# Patient Record
Sex: MALE | URBAN - METROPOLITAN AREA
[De-identification: names, ages, dates, MRNs, and addresses within clinical notes are randomized per-mention and may not be internally consistent; named-entity substitution may affect disease eponyms.]

---

## 2017-01-13 ENCOUNTER — APPOINTMENT (RX ONLY)
Dept: URBAN - METROPOLITAN AREA CLINIC 100 | Facility: CLINIC | Age: 72
Setting detail: DERMATOLOGY
End: 2017-01-13

## 2017-01-13 DIAGNOSIS — L70.0 ACNE VULGARIS: ICD-10-CM

## 2017-01-13 PROCEDURE — ? ACNE SURGERY (MULTIPLE LESIONS)

## 2017-01-13 PROCEDURE — 10040 EXTRACTION: CPT

## 2017-01-13 ASSESSMENT — LOCATION DETAILED DESCRIPTION DERM
LOCATION DETAILED: LEFT INFERIOR CENTRAL MALAR CHEEK
LOCATION DETAILED: NASAL TIP
LOCATION DETAILED: NASAL DORSUM

## 2017-01-13 ASSESSMENT — LOCATION ZONE DERM
LOCATION ZONE: NOSE
LOCATION ZONE: FACE

## 2017-01-13 ASSESSMENT — LOCATION SIMPLE DESCRIPTION DERM
LOCATION SIMPLE: LEFT CHEEK
LOCATION SIMPLE: NOSE

## 2017-01-13 NOTE — PROCEDURE: ACNE SURGERY (MULTIPLE LESIONS)
Prep Text (Optional): Prior to removal the treatment areas were prepped in the usual fashion.
Extraction Method: 18 gauge needle and comedo extractor
Render Post-Care Instructions In Note?: no
Detail Level: Detailed
Total Number Of Lesions Treated: 10
Consent was obtained and risks were reviewed including but not limited to scarring, infection, bleeding, scabbing, incomplete removal, and allergy to anesthesia.
Render Number Of Lesions Treated: yes
Acne Type: Comedonal Lesions
Post-Care Instructions: I reviewed with the patient in detail post-care instructions. Patient is to keep the treatment areaas dry overnight, and then apply bacitracin twice daily until healed. Patient may apply hydrogen peroxide soaks to remove any crusting.

## 2017-03-17 ENCOUNTER — APPOINTMENT (RX ONLY)
Dept: URBAN - METROPOLITAN AREA CLINIC 100 | Facility: CLINIC | Age: 72
Setting detail: DERMATOLOGY
End: 2017-03-17

## 2017-03-17 DIAGNOSIS — L70.0 ACNE VULGARIS: ICD-10-CM

## 2017-03-17 PROBLEM — H91.90 UNSPECIFIED HEARING LOSS, UNSPECIFIED EAR: Status: ACTIVE | Noted: 2017-03-17

## 2017-03-17 PROCEDURE — 10040 EXTRACTION: CPT

## 2017-03-17 PROCEDURE — ? ACNE SURGERY (MULTIPLE LESIONS)

## 2017-03-17 ASSESSMENT — LOCATION DETAILED DESCRIPTION DERM
LOCATION DETAILED: NASAL SUPRATIP
LOCATION DETAILED: LEFT CHIN

## 2017-03-17 ASSESSMENT — LOCATION ZONE DERM
LOCATION ZONE: FACE
LOCATION ZONE: NOSE

## 2017-03-17 ASSESSMENT — LOCATION SIMPLE DESCRIPTION DERM
LOCATION SIMPLE: NOSE
LOCATION SIMPLE: CHIN

## 2017-03-17 NOTE — PROCEDURE: ACNE SURGERY (MULTIPLE LESIONS)
Render Number Of Lesions Treated: yes
Render Post-Care Instructions In Note?: no
Extraction Method: 18 gauge needle and comedo extractor
Total Number Of Lesions Treated: 12
Prep Text (Optional): Prior to removal the treatment areas were prepped in the usual fashion.
Detail Level: Detailed
Acne Type: Comedonal Lesions
Consent was obtained and risks were reviewed including but not limited to scarring, infection, bleeding, scabbing, incomplete removal, and allergy to anesthesia.
Post-Care Instructions: I reviewed with the patient in detail post-care instructions. Patient is to keep the treatment areaas dry overnight, and then apply bacitracin twice daily until healed. Patient may apply hydrogen peroxide soaks to remove any crusting.

## 2017-07-21 ENCOUNTER — APPOINTMENT (RX ONLY)
Dept: URBAN - METROPOLITAN AREA CLINIC 100 | Facility: CLINIC | Age: 72
Setting detail: DERMATOLOGY
End: 2017-07-21

## 2017-07-21 DIAGNOSIS — M71 OTHER BURSOPATHIES: ICD-10-CM

## 2017-07-21 DIAGNOSIS — L81.4 OTHER MELANIN HYPERPIGMENTATION: ICD-10-CM

## 2017-07-21 DIAGNOSIS — Z12.83 ENCOUNTER FOR SCREENING FOR MALIGNANT NEOPLASM OF SKIN: ICD-10-CM

## 2017-07-21 DIAGNOSIS — L82.0 INFLAMED SEBORRHEIC KERATOSIS: ICD-10-CM

## 2017-07-21 DIAGNOSIS — L70.8 OTHER ACNE: ICD-10-CM

## 2017-07-21 DIAGNOSIS — L72.8 OTHER FOLLICULAR CYSTS OF THE SKIN AND SUBCUTANEOUS TISSUE: ICD-10-CM

## 2017-07-21 PROBLEM — Z85.828 PERSONAL HISTORY OF OTHER MALIGNANT NEOPLASM OF SKIN: Status: ACTIVE | Noted: 2017-07-21

## 2017-07-21 PROBLEM — L85.3 XEROSIS CUTIS: Status: ACTIVE | Noted: 2017-07-21

## 2017-07-21 PROBLEM — M12.9 ARTHROPATHY, UNSPECIFIED: Status: ACTIVE | Noted: 2017-07-21

## 2017-07-21 PROBLEM — M71.371 OTHER BURSAL CYST, RIGHT ANKLE AND FOOT: Status: ACTIVE | Noted: 2017-07-21

## 2017-07-21 PROCEDURE — ? COUNSELING

## 2017-07-21 PROCEDURE — 11900 INJECT SKIN LESIONS </W 7: CPT | Mod: 59

## 2017-07-21 PROCEDURE — ? INTRALESIONAL KENALOG

## 2017-07-21 PROCEDURE — ? OTHER

## 2017-07-21 PROCEDURE — 99214 OFFICE O/P EST MOD 30 MIN: CPT | Mod: 25

## 2017-07-21 PROCEDURE — 17110 DESTRUCTION B9 LES UP TO 14: CPT

## 2017-07-21 PROCEDURE — ? LIQUID NITROGEN

## 2017-07-21 ASSESSMENT — LOCATION SIMPLE DESCRIPTION DERM
LOCATION SIMPLE: RIGHT CHEEK
LOCATION SIMPLE: RIGHT CALF
LOCATION SIMPLE: LEFT UPPER BACK
LOCATION SIMPLE: LEFT CHEEK
LOCATION SIMPLE: RIGHT 3RD TOE
LOCATION SIMPLE: LEFT CALF
LOCATION SIMPLE: RIGHT UPPER BACK

## 2017-07-21 ASSESSMENT — LOCATION DETAILED DESCRIPTION DERM
LOCATION DETAILED: RIGHT INFERIOR UPPER BACK
LOCATION DETAILED: RIGHT DISTAL CALF
LOCATION DETAILED: RIGHT DORSAL 3RD TOE
LOCATION DETAILED: RIGHT INFERIOR CENTRAL BUCCAL CHEEK
LOCATION DETAILED: LEFT INFERIOR CENTRAL MALAR CHEEK
LOCATION DETAILED: RIGHT CENTRAL MALAR CHEEK
LOCATION DETAILED: RIGHT SUPERIOR UPPER BACK
LOCATION DETAILED: LEFT MID-UPPER BACK
LOCATION DETAILED: LEFT DISTAL CALF

## 2017-07-21 ASSESSMENT — LOCATION ZONE DERM
LOCATION ZONE: FACE
LOCATION ZONE: TOE
LOCATION ZONE: TRUNK
LOCATION ZONE: LEG

## 2017-07-21 NOTE — PROCEDURE: OTHER
Note Text (......Xxx Chief Complaint.): This diagnosis correlates with the
Detail Level: Zone
Other (Free Text): - continue acne surgery

## 2017-07-21 NOTE — PROCEDURE: LIQUID NITROGEN
Consent: The patient's consent was obtained including but not limited to risks of crusting, scabbing, blistering, scarring, darker or lighter pigmentary change, recurrence, incomplete removal and infection.
Include Z78.9 (Other Specified Conditions Influencing Health Status) As An Associated Diagnosis?: Yes
Render Post-Care Instructions In Note?: no
Medical Necessity Clause: This procedure was medically necessary because the lesions that were treated were:
Medical Necessity Information: It is in your best interest to select a reason for this procedure from the list below. All of these items fulfill various CMS LCD requirements except the new and changing color options.
Post-Care Instructions: I reviewed with the patient in detail post-care instructions. Patient is to wear sunprotection, and avoid picking at any of the treated lesions. Pt may apply Vaseline to crusted or scabbing areas.
Number Of Freeze-Thaw Cycles: 2 freeze-thaw cycles
Detail Level: Detailed

## 2017-07-21 NOTE — PROCEDURE: INTRALESIONAL KENALOG
Medical Necessity Clause: This procedure was medically necessary because the lesions that were treated were:
Kenalog Preparation: Kenalog
Detail Level: Detailed
X Size Of Lesion In Cm (Optional): 0
Include Z78.9 (Other Specified Conditions Influencing Health Status) As An Associated Diagnosis?: No
Lot # (Optional): [id number]
Consent: The risks of atrophy were reviewed with the patient.
Total Volume Injected (Ccs- Only Use Numbers And Decimals): 0.2
Concentration Of Solution Injected (Mg/Ml): 10.0
Administered By (Optional): Ernst
Expiration Date (Optional): 05/2018

## 2017-11-16 ENCOUNTER — APPOINTMENT (RX ONLY)
Dept: URBAN - METROPOLITAN AREA CLINIC 100 | Facility: CLINIC | Age: 72
Setting detail: DERMATOLOGY
End: 2017-11-16

## 2017-11-16 DIAGNOSIS — L70.0 ACNE VULGARIS: ICD-10-CM

## 2017-11-16 PROCEDURE — ? ACNE SURGERY (MULTIPLE LESIONS)

## 2017-11-16 PROCEDURE — 10040 EXTRACTION: CPT

## 2017-11-16 PROCEDURE — ? COUNSELING

## 2017-11-16 ASSESSMENT — LOCATION DETAILED DESCRIPTION DERM
LOCATION DETAILED: LEFT INFERIOR CENTRAL MALAR CHEEK
LOCATION DETAILED: LEFT LOWER CUTANEOUS LIP
LOCATION DETAILED: NASAL DORSUM
LOCATION DETAILED: RIGHT MEDIAL MALAR CHEEK
LOCATION DETAILED: INFERIOR MID FOREHEAD

## 2017-11-16 ASSESSMENT — LOCATION SIMPLE DESCRIPTION DERM
LOCATION SIMPLE: LEFT LIP
LOCATION SIMPLE: LEFT CHEEK
LOCATION SIMPLE: RIGHT CHEEK
LOCATION SIMPLE: INFERIOR FOREHEAD
LOCATION SIMPLE: NOSE

## 2017-11-16 ASSESSMENT — LOCATION ZONE DERM
LOCATION ZONE: NOSE
LOCATION ZONE: FACE
LOCATION ZONE: LIP

## 2017-11-16 NOTE — PROCEDURE: ACNE SURGERY (MULTIPLE LESIONS)
Post-Care Instructions: I reviewed with the patient in detail post-care instructions. Patient is to keep the treatment areas dry overnight, and then apply bacitracin twice daily until healed. Patient may apply hydrogen peroxide soaks to remove any crusting.
Render Post-Care Instructions In Note?: no
Render Number Of Lesions Treated: yes
Detail Level: Simple
Consent was obtained and risks were reviewed including but not limited to scarring, infection, bleeding, scabbing, incomplete removal, and allergy to anesthesia.
Prep Text (Optional): Prior to removal the treatment areas were prepped in the usual fashion.
Extraction Method: 18 gauge needle and comedo extractor
Acne Type: Comedonal Lesions
Total Number Of Lesions Treated: 20

## 2017-12-04 ENCOUNTER — APPOINTMENT (RX ONLY)
Dept: URBAN - METROPOLITAN AREA CLINIC 100 | Facility: CLINIC | Age: 72
Setting detail: DERMATOLOGY
End: 2017-12-04

## 2017-12-04 DIAGNOSIS — L72.8 OTHER FOLLICULAR CYSTS OF THE SKIN AND SUBCUTANEOUS TISSUE: ICD-10-CM

## 2017-12-04 DIAGNOSIS — L57.8 OTHER SKIN CHANGES DUE TO CHRONIC EXPOSURE TO NONIONIZING RADIATION: ICD-10-CM

## 2017-12-04 DIAGNOSIS — L29.8 OTHER PRURITUS: ICD-10-CM

## 2017-12-04 DIAGNOSIS — L82.0 INFLAMED SEBORRHEIC KERATOSIS: ICD-10-CM

## 2017-12-04 DIAGNOSIS — L29.89 OTHER PRURITUS: ICD-10-CM

## 2017-12-04 DIAGNOSIS — M71 OTHER BURSOPATHIES: ICD-10-CM

## 2017-12-04 PROBLEM — M71.372 OTHER BURSAL CYST, LEFT ANKLE AND FOOT: Status: ACTIVE | Noted: 2017-12-04

## 2017-12-04 PROCEDURE — ? PRESCRIPTION

## 2017-12-04 PROCEDURE — ? OTHER (COSMETIC)

## 2017-12-04 PROCEDURE — 11900 INJECT SKIN LESIONS </W 7: CPT | Mod: 59

## 2017-12-04 PROCEDURE — ? COUNSELING

## 2017-12-04 PROCEDURE — 17111 DESTRUCTION B9 LESIONS 15/>: CPT

## 2017-12-04 PROCEDURE — 99213 OFFICE O/P EST LOW 20 MIN: CPT | Mod: 25

## 2017-12-04 PROCEDURE — ? INTRALESIONAL KENALOG

## 2017-12-04 PROCEDURE — ? LIQUID NITROGEN

## 2017-12-04 RX ORDER — GABAPENTIN 300 MG/1
CAPSULE ORAL
Qty: 15 | Refills: 2 | Status: ERX | COMMUNITY
Start: 2017-12-04

## 2017-12-04 RX ADMIN — GABAPENTIN: 300 CAPSULE ORAL at 00:00

## 2017-12-04 ASSESSMENT — LOCATION SIMPLE DESCRIPTION DERM
LOCATION SIMPLE: RIGHT CALF
LOCATION SIMPLE: LEFT PRETIBIAL REGION
LOCATION SIMPLE: RIGHT PRETIBIAL REGION
LOCATION SIMPLE: RIGHT CHEEK
LOCATION SIMPLE: LEFT GREAT TOE
LOCATION SIMPLE: RIGHT THIGH
LOCATION SIMPLE: LEFT UPPER ARM
LOCATION SIMPLE: RIGHT POSTERIOR THIGH
LOCATION SIMPLE: LEFT THIGH
LOCATION SIMPLE: LEFT CHEEK
LOCATION SIMPLE: CHIN
LOCATION SIMPLE: LEFT CALF

## 2017-12-04 ASSESSMENT — LOCATION DETAILED DESCRIPTION DERM
LOCATION DETAILED: RIGHT DISTAL PRETIBIAL REGION
LOCATION DETAILED: RIGHT PROXIMAL PRETIBIAL REGION
LOCATION DETAILED: LEFT PROXIMAL CALF
LOCATION DETAILED: RIGHT ANTERIOR DISTAL THIGH
LOCATION DETAILED: LEFT ANTERIOR DISTAL UPPER ARM
LOCATION DETAILED: LEFT PROXIMAL PRETIBIAL REGION
LOCATION DETAILED: RIGHT PROXIMAL CALF
LOCATION DETAILED: RIGHT INFERIOR CENTRAL MALAR CHEEK
LOCATION DETAILED: LEFT DISTAL PRETIBIAL REGION
LOCATION DETAILED: RIGHT DISTAL CALF
LOCATION DETAILED: RIGHT DISTAL POSTERIOR THIGH
LOCATION DETAILED: LEFT INFERIOR CENTRAL MALAR CHEEK
LOCATION DETAILED: LEFT DORSAL GREAT TOE
LOCATION DETAILED: LEFT CHIN
LOCATION DETAILED: RIGHT CHIN
LOCATION DETAILED: RIGHT MEDIAL PROXIMAL PRETIBIAL REGION
LOCATION DETAILED: LEFT ANTERIOR PROXIMAL THIGH

## 2017-12-04 ASSESSMENT — LOCATION ZONE DERM
LOCATION ZONE: ARM
LOCATION ZONE: TOE
LOCATION ZONE: LEG
LOCATION ZONE: FACE

## 2017-12-04 NOTE — HPI: PIMPLES (ACNE)
How Severe Is Your Acne?: mild
Is This A New Presentation, Or A Follow-Up?: Acne
Additional Comments (Use Complete Sentences): Patient reports lesion on the right chin was treated with ILK several months ago and states it has not resolved.

## 2017-12-04 NOTE — HPI: ITCHING
How Did Your Itching Occur?: gradual in onset  (over a period of years)
How Severe Is Your Itching?: mild
Additional History: Patient states the rash is only present at [facility] and is made better by Benadryl

## 2017-12-04 NOTE — PROCEDURE: LIQUID NITROGEN
Medical Necessity Information: It is in your best interest to select a reason for this procedure from the list below. All of these items fulfill various CMS LCD requirements except the new and changing color options.
Include Z78.9 (Other Specified Conditions Influencing Health Status) As An Associated Diagnosis?: Yes
Render Post-Care Instructions In Note?: no
Medical Necessity Clause: This procedure was medically necessary because the lesions that were treated were:
Detail Level: Detailed
Post-Care Instructions: I reviewed with the patient in detail post-care instructions. Patient is to wear sunprotection, and avoid picking at any of the treated lesions. Pt may apply Vaseline to crusted or scabbing areas.
Number Of Freeze-Thaw Cycles: 2 freeze-thaw cycles
Consent: The patient's consent was obtained including but not limited to risks of crusting, scabbing, blistering, scarring, darker or lighter pigmentary change, recurrence, incomplete removal and infection.

## 2017-12-04 NOTE — PROCEDURE: INTRALESIONAL KENALOG
Kenalog Preparation: Kenalog
Total Volume Injected (Ccs- Only Use Numbers And Decimals): 0.1
Include Z78.9 (Other Specified Conditions Influencing Health Status) As An Associated Diagnosis?: No
Consent: The risks of atrophy were reviewed with the patient.
Lot # (Optional): [id number]
Medical Necessity Clause: This procedure was medically necessary because the lesions that were treated were:
Expiration Date (Optional): Mar 2019
Concentration Of Solution Injected (Mg/Ml): 5.0
Detail Level: Detailed
Administered By (Optional): Ernst
X Size Of Lesion In Cm (Optional): 0

## 2018-01-26 ENCOUNTER — APPOINTMENT (RX ONLY)
Dept: URBAN - METROPOLITAN AREA CLINIC 100 | Facility: CLINIC | Age: 73
Setting detail: DERMATOLOGY
End: 2018-01-26

## 2018-01-26 DIAGNOSIS — D485 NEOPLASM OF UNCERTAIN BEHAVIOR OF SKIN: ICD-10-CM

## 2018-01-26 DIAGNOSIS — L57.8 OTHER SKIN CHANGES DUE TO CHRONIC EXPOSURE TO NONIONIZING RADIATION: ICD-10-CM

## 2018-01-26 DIAGNOSIS — L82.0 INFLAMED SEBORRHEIC KERATOSIS: ICD-10-CM

## 2018-01-26 PROBLEM — L85.3 XEROSIS CUTIS: Status: ACTIVE | Noted: 2018-01-26

## 2018-01-26 PROBLEM — D48.5 NEOPLASM OF UNCERTAIN BEHAVIOR OF SKIN: Status: ACTIVE | Noted: 2018-01-26

## 2018-01-26 PROBLEM — H91.90 UNSPECIFIED HEARING LOSS, UNSPECIFIED EAR: Status: ACTIVE | Noted: 2018-01-26

## 2018-01-26 PROBLEM — M12.9 ARTHROPATHY, UNSPECIFIED: Status: ACTIVE | Noted: 2018-01-26

## 2018-01-26 PROCEDURE — 11100: CPT

## 2018-01-26 PROCEDURE — 99213 OFFICE O/P EST LOW 20 MIN: CPT | Mod: 25

## 2018-01-26 PROCEDURE — ? BIOPSY BY SHAVE METHOD

## 2018-01-26 PROCEDURE — ? PHOTODYNAMIC THERAPY COUNSELING

## 2018-01-26 PROCEDURE — ? PRESCRIPTION

## 2018-01-26 PROCEDURE — ? OTHER

## 2018-01-26 PROCEDURE — ? COUNSELING

## 2018-01-26 RX ORDER — TRETINOIN 0.5 MG/G
CREAM TOPICAL
Qty: 1 | Refills: 6 | Status: ERX | COMMUNITY
Start: 2018-01-26

## 2018-01-26 RX ADMIN — TRETINOIN: 0.5 CREAM TOPICAL at 20:01

## 2018-01-26 ASSESSMENT — LOCATION SIMPLE DESCRIPTION DERM: LOCATION SIMPLE: CHIN

## 2018-01-26 ASSESSMENT — LOCATION ZONE DERM: LOCATION ZONE: FACE

## 2018-01-26 ASSESSMENT — LOCATION DETAILED DESCRIPTION DERM: LOCATION DETAILED: RIGHT CHIN

## 2018-01-26 NOTE — PROCEDURE: OTHER
Detail Level: Zone
Note Text (......Xxx Chief Complaint.): This diagnosis correlates with the
Other (Free Text): PDT to actinic damage on lowers legs.\\nSchedule in 2 weeks

## 2018-01-26 NOTE — PROCEDURE: BIOPSY BY SHAVE METHOD
Dressing: no dressing applied
Electrodesiccation Text: The wound bed was treated with electrodesiccation after the biopsy was performed.
Biopsy Method: Personna blade
Biopsy Type: H and E
Bill For Surgical Tray: no
Size Of Lesion In Cm: 0
Render Post-Care Instructions In Note?: yes
Wound Care: Vaseline
Type Of Destruction Used: Curettage
Anesthesia Volume In Cc (Will Not Render If 0): 1
Silver Nitrate Text: The wound bed was treated with silver nitrate after the biopsy was performed.
Post-Care Instructions: I reviewed with the patient in detail post-care instructions. Patient is to keep the biopsy site dry overnight, and then apply bacitracin twice daily until healed. Patient may apply hydrogen peroxide soaks to remove any crusting.
Lab: [id number]
Consent: Written consent was obtained and risks were reviewed including but not limited to scarring, infection, bleeding, scabbing, incomplete removal, nerve damage and allergy to anesthesia.
Hemostasis: Drysol
Billing Type: Third-Party Bill
Anesthesia Type: 2% lidocaine with epinephrine
Detail Level: Detailed
Cryotherapy Text: The wound bed was treated with cryotherapy after the biopsy was performed.
Electrodesiccation And Curettage Text: The wound bed was treated with electrodesiccation and curettage after the biopsy was performed.
Notification Instructions: Patient will be notified of biopsy results. However, patient instructed to call the office if not contacted within 2 weeks.

## 2018-02-23 ENCOUNTER — APPOINTMENT (RX ONLY)
Dept: URBAN - METROPOLITAN AREA CLINIC 100 | Facility: CLINIC | Age: 73
Setting detail: DERMATOLOGY
End: 2018-02-23

## 2018-02-23 ENCOUNTER — RX ONLY (OUTPATIENT)
Age: 73
Setting detail: RX ONLY
End: 2018-02-23

## 2018-02-23 DIAGNOSIS — L57.0 ACTINIC KERATOSIS: ICD-10-CM

## 2018-02-23 PROBLEM — Z85.828 PERSONAL HISTORY OF OTHER MALIGNANT NEOPLASM OF SKIN: Status: ACTIVE | Noted: 2018-02-23

## 2018-02-23 PROCEDURE — 96567 PDT DSTR PRMLG LES SKN: CPT

## 2018-02-23 PROCEDURE — ? PDT: BLUE

## 2018-02-23 PROCEDURE — ? COUNSELING

## 2018-02-23 RX ORDER — TRIAMCINOLONE ACETONIDE 1 MG/G
CREAM TOPICAL
Qty: 1 | Refills: 0 | Status: ERX | COMMUNITY
Start: 2018-02-23

## 2018-02-23 ASSESSMENT — LOCATION DETAILED DESCRIPTION DERM
LOCATION DETAILED: LEFT DISTAL PRETIBIAL REGION
LOCATION DETAILED: RIGHT DISTAL PRETIBIAL REGION

## 2018-02-23 ASSESSMENT — LOCATION SIMPLE DESCRIPTION DERM
LOCATION SIMPLE: LEFT PRETIBIAL REGION
LOCATION SIMPLE: RIGHT PRETIBIAL REGION

## 2018-02-23 ASSESSMENT — LOCATION ZONE DERM: LOCATION ZONE: LEG

## 2018-02-23 NOTE — PROCEDURE: PDT: BLUE
Comments: Had to stop treatment after 3 mins; was too uncomfortable for the patient. Dr. Dukes evaluated legs and requested we send a Rx for TAC 0.1% cream to [facility] pharmacy
Detail Level: Simple
Incubation Time: 07:50:00
Number Of Kerasticks/Tubes Billed For: 2
Ndc# (Optional): [id number]
Was Levulan/Ameluz Applied On A Previous Day?: No
Total Number Of Aks Treated (Optional To Report): 15
Post-Care Instructions: I reviewed with the patient in detail post-care instructions. Patient is to avoid sunlight for the next 2 days, and wear sun protection. Patients may expect sunburn like redness, discomfort and scabbing.
Occlusion: Yes
Which Photosensitizer Was Used: Levulan
Debridement Text (Will Only Render In Visit Note If You Select Debridement Option Under Who Performed The Pdt Field): Prior to application of the photodynamic medication the hyperkeratotic lesions were curetted to make them more amenable to therapy.
Illumination Time: 00:03:00
Treatment Number: 1
Pre-Procedure Text: The treatment areas were cleaned and prepped in the usual fashion.
Anesthesia Type: 1% lidocaine with epinephrine
Who Performed The Pdt?: Performed by Nurse, MA or Aesthetician ([id number])
Consent: Written consent obtained.  The risks were reviewed with the patient including but not limited to: pigmentary changes, pain, blistering, scabbing, redness, and the remote possibility of scarring.
Light Source: Montez-U

## 2018-02-27 ENCOUNTER — APPOINTMENT (RX ONLY)
Dept: URBAN - METROPOLITAN AREA CLINIC 100 | Facility: CLINIC | Age: 73
Setting detail: DERMATOLOGY
End: 2018-02-27

## 2018-02-27 DIAGNOSIS — L57.0 ACTINIC KERATOSIS: ICD-10-CM

## 2018-02-27 DIAGNOSIS — L72.0 EPIDERMAL CYST: ICD-10-CM

## 2018-02-27 PROBLEM — Z85.828 PERSONAL HISTORY OF OTHER MALIGNANT NEOPLASM OF SKIN: Status: ACTIVE | Noted: 2018-02-27

## 2018-02-27 PROCEDURE — ? COUNSELING

## 2018-02-27 PROCEDURE — 99213 OFFICE O/P EST LOW 20 MIN: CPT

## 2018-02-27 PROCEDURE — ? PRESCRIPTION

## 2018-02-27 RX ORDER — TRIAMCINOLONE ACETONIDE 1 MG/G
CREAM TOPICAL
Qty: 1 | Refills: 1 | Status: ERX

## 2018-02-27 ASSESSMENT — LOCATION SIMPLE DESCRIPTION DERM
LOCATION SIMPLE: RIGHT INFERIOR EYELID
LOCATION SIMPLE: LEFT PRETIBIAL REGION
LOCATION SIMPLE: RIGHT PRETIBIAL REGION

## 2018-02-27 ASSESSMENT — LOCATION DETAILED DESCRIPTION DERM
LOCATION DETAILED: LEFT DISTAL PRETIBIAL REGION
LOCATION DETAILED: RIGHT LATERAL INFERIOR EYELID
LOCATION DETAILED: RIGHT PROXIMAL PRETIBIAL REGION

## 2018-02-27 ASSESSMENT — LOCATION ZONE DERM
LOCATION ZONE: LEG
LOCATION ZONE: EYELID

## 2024-12-10 ENCOUNTER — APPOINTMENT (RX ONLY)
Age: 79
Setting detail: DERMATOLOGY
End: 2024-12-10

## 2024-12-10 DIAGNOSIS — L30.8 OTHER SPECIFIED DERMATITIS: ICD-10-CM | Status: INADEQUATELY CONTROLLED

## 2024-12-10 DIAGNOSIS — L57.0 ACTINIC KERATOSIS: ICD-10-CM

## 2024-12-10 DIAGNOSIS — M71 OTHER BURSOPATHIES: ICD-10-CM

## 2024-12-10 PROBLEM — M71.371 OTHER BURSAL CYST, RIGHT ANKLE AND FOOT: Status: ACTIVE | Noted: 2024-12-10

## 2024-12-10 PROCEDURE — 17000 DESTRUCT PREMALG LESION: CPT

## 2024-12-10 PROCEDURE — ? SUNSCREEN RECOMMENDATIONS

## 2024-12-10 PROCEDURE — ? PRESCRIPTION MEDICATION MANAGEMENT

## 2024-12-10 PROCEDURE — ? MEDICATION COUNSELING

## 2024-12-10 PROCEDURE — ? DEFER

## 2024-12-10 PROCEDURE — 99213 OFFICE O/P EST LOW 20 MIN: CPT | Mod: 25

## 2024-12-10 PROCEDURE — ? COUNSELING

## 2024-12-10 PROCEDURE — ? LIQUID NITROGEN

## 2024-12-10 PROCEDURE — 17003 DESTRUCT PREMALG LES 2-14: CPT

## 2024-12-10 PROCEDURE — ? PRESCRIPTION

## 2024-12-10 RX ORDER — TRIAMCINOLONE ACETONIDE 1 MG/G
OINTMENT TOPICAL
Qty: 454 | Refills: 4 | Status: ERX | COMMUNITY
Start: 2024-12-10

## 2024-12-10 RX ADMIN — TRIAMCINOLONE ACETONIDE: 1 OINTMENT TOPICAL at 00:00

## 2024-12-10 ASSESSMENT — LOCATION SIMPLE DESCRIPTION DERM
LOCATION SIMPLE: RIGHT UPPER BACK
LOCATION SIMPLE: LEFT FOREHEAD
LOCATION SIMPLE: RIGHT FOREHEAD
LOCATION SIMPLE: NOSE
LOCATION SIMPLE: RIGHT 2ND TOE

## 2024-12-10 ASSESSMENT — SEVERITY ASSESSMENT 2020: SEVERITY 2020: MODERATE

## 2024-12-10 ASSESSMENT — PAIN INTENSITY VAS: HOW INTENSE IS YOUR PAIN 0 BEING NO PAIN, 10 BEING THE MOST SEVERE PAIN POSSIBLE?: NO PAIN

## 2024-12-10 ASSESSMENT — LOCATION ZONE DERM
LOCATION ZONE: TOE
LOCATION ZONE: TRUNK
LOCATION ZONE: FACE
LOCATION ZONE: NOSE

## 2024-12-10 ASSESSMENT — LOCATION DETAILED DESCRIPTION DERM
LOCATION DETAILED: RIGHT SUPERIOR MEDIAL UPPER BACK
LOCATION DETAILED: RIGHT INFERIOR LATERAL FOREHEAD
LOCATION DETAILED: RIGHT DORSAL 2ND TOE
LOCATION DETAILED: NASAL ROOT
LOCATION DETAILED: RIGHT SUPERIOR FOREHEAD
LOCATION DETAILED: RIGHT FOREHEAD
LOCATION DETAILED: LEFT INFERIOR FOREHEAD

## 2024-12-10 ASSESSMENT — ITCH NUMERIC RATING SCALE: HOW SEVERE IS YOUR ITCHING?: 7
